# Patient Record
Sex: FEMALE | Race: WHITE | URBAN - METROPOLITAN AREA
[De-identification: names, ages, dates, MRNs, and addresses within clinical notes are randomized per-mention and may not be internally consistent; named-entity substitution may affect disease eponyms.]

---

## 2017-01-12 DIAGNOSIS — L64.9 ANDROGENETIC ALOPECIA: Primary | ICD-10-CM

## 2017-01-13 RX ORDER — MOMETASONE FUROATE 1 MG/G
CREAM TOPICAL
Qty: 45 G | Refills: 1 | Status: SHIPPED | OUTPATIENT
Start: 2017-01-13

## 2017-01-13 NOTE — TELEPHONE ENCOUNTER
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 Per last note 9/19/16 patient is to continue this medication until f/u appointment 3/13/17. Refill granted.

## 2017-01-13 NOTE — TELEPHONE ENCOUNTER
Last seen 9/19/16: Appt schedule for 3/13/17  Androgenetic alopecia with suspected concomitant traction alopecia and seborrheic dermatitis: Hair regrowth along bitemporal scalp is significantly improved when compared to photos taken a previous visit.  Continues to have diffuse hair thinning on vertex scalp, with mild improvement compared to previous photos. Adherent greasy scalp present diffusely on scalp consistent with seborrheic dermatitis, which may be contributing to less significant hair regrowth on vertex scalp.      Start derma-smoothe 0.01% oil 2-3 x per week. Discussed with patient that she does not have to wash out oil the next day if she does not want to, but instructed her to apply it to her scalp the night before she goes to the salon to get hair washed so that it can be washed out at that time.       Continue mometasone 0.1% cream to scalp daily    Continue laser comb 3-4x per week    Discussed starting Rogaine with patient at next visit once she has started to treat her scalp with derma-smoothe.  Advised patient to determine whether she would like to initially try the foam or the liquid formulation, and to let us know at her next visit.    Checked patient's BP today, given added HCTZ 12.5 mg since last visit, and will consider adding spironolactone at future visits if Ok with her primary care doctor.      Photographs taken for future reference    CC Dr. Araujo on close of this encounter.    Follow-up in 5 and in 6 months, earlier for new or changing lesions.

## 2017-03-13 ENCOUNTER — OFFICE VISIT (OUTPATIENT)
Dept: DERMATOLOGY | Facility: CLINIC | Age: 72
End: 2017-03-13

## 2017-03-13 VITALS — HEART RATE: 57 BPM | SYSTOLIC BLOOD PRESSURE: 104 MMHG | DIASTOLIC BLOOD PRESSURE: 68 MMHG

## 2017-03-13 DIAGNOSIS — L65.8 TRACTION ALOPECIA: ICD-10-CM

## 2017-03-13 DIAGNOSIS — L30.9 DERMATITIS: ICD-10-CM

## 2017-03-13 DIAGNOSIS — L65.9 LOSS OF HAIR: Primary | ICD-10-CM

## 2017-03-13 ASSESSMENT — PAIN SCALES - GENERAL: PAINLEVEL: NO PAIN (0)

## 2017-03-13 NOTE — NURSING NOTE
"Dermatology Rooming Note    Kim Mcfarland's goals for this visit include:   Chief Complaint   Patient presents with     Hair Loss     Kim is here for hair loss, Kim states \" Its worse maybe I need a shot.\"     Nika Hammonds LPN  "

## 2017-03-13 NOTE — MR AVS SNAPSHOT
After Visit Summary   3/13/2017    Kim Mcfarland    MRN: [id number]           Patient Information     Date Of Birth          1945        Visit Information        Provider Department      3/13/2017 10:45 AM Keila Ocampo MD [facility]        Today's Diagnoses     Loss of hair    -  1    Dermatitis        Traction alopecia           Follow-ups after your visit        Your next 10 appointments already scheduled     2017 10:00 AM CDT   (Arrive by 9:45 AM)   RETURN HAIRLOSS with MD LUI[facility] ([facility])    [address] [place] [id number] [phone]              Who to contact     Please call your clinic at [phone] to:    Ask questions about your health    Make or cancel appointments    Discuss your medicines    Learn about your test results    Speak to your doctor   If you have compliments or concerns about an experience at your clinic, or if you wish to file a complaint, please contact [facility] Patient Relations at [phone] or email us at [email or web address]         Additional Information About Your Visit        MyChart Information     PrepChampst is an electronic gateway that provides easy, online access to your medical records. With FolderBoy, you can request a clinic appointment, read your test results, renew a prescription or communicate with your care team.     To sign up for PrepChampst visit the website at [email or web address]   You will be asked to enter the access code listed below, as well as some personal information. Please follow the directions to create your username and password.     Your access code is: [email or web address]  Expires: 2017  7:30 AM     Your access code will  in 90 days. If you need help or a new code, please contact your [facility] or call [phone] for  assistance.        Care EveryWhere ID     This is your Care EveryWhere ID. This could be used by other organizations to access your [place] medical records  [id number]        Your Vitals Were     Pulse                   57            Blood Pressure from Last 3 Encounters:   03/13/17 104/68   09/19/16 105/68   04/19/16 140/85    Weight from Last 3 Encounters:   01/25/16 63.5 kg (140 lb)   01/12/16 63.8 kg (140 lb 9.6 oz)              Today, you had the following     No orders found for display       Primary Care Provider Office Phone # Fax #    Bernadette Alcaraz [phone]       [facility] [address] [facility] [address]        Thank you!     Thank you for choosing [facility]  for your care. Our goal is always to provide you with excellent care. Hearing back from our patients is one way we can continue to improve our services. Please take a few minutes to complete the written survey that you may receive in the mail after your visit with us. Thank you!             Your Updated Medication List - Protect others around you: Learn how to safely use, store and throw away your medicines at www.disposemymeds.org.          This list is accurate as of: 3/13/17 11:59 PM.  Always use your most recent med list.                   Brand Name Dispense Instructions for use    ASPIRIN PO      Take by mouth at onset of headache       citalopram 10 MG tablet    celeXA     Take 5 mg by mouth daily       * DERMA-SMOOTHE/FS BODY 0.01 % Oil     118.28 mL    Apply 1 to 2 capfulls to scalp 2-3 times per week, Massage into dry scalp, leave on overnight       * DERMA-SMOOTHE/FS SCALP 0.01 % Oil     118 mL    Apply thin layer of oil to scalp 2-3 times per week (can wash out in the morning if desired) to help remove scale present on scalp.  Apply to scalp the night before going to hair salon to have oil washed out the next day.       famotidine 20 MG tablet    PEPCID     Take 20 mg by mouth 2 times daily        LISINOPRIL PO      Take 20 mg by mouth daily       mometasone 0.1 % cream    ELOCON    45 g    APPLY TOPICALLY TO THE AFFECTED AREA TO AREAS OF FRONTAL HAIRLOSS       MULTIVITAL PO          RIZATRIPTAN BENZOATE PO      Take 10 mg by mouth at onset of headache       TOPIRAMATE PO      Take 25 mg by mouth daily       * Notice:  This list has 2 medication(s) that are the same as other medications prescribed for you. Read the directions carefully, and ask your doctor or other care provider to review them with you.

## 2017-03-13 NOTE — PROGRESS NOTES
"[facility] Dermatology Note    Dermatology Problem List:  1. 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 Irritated seborrheic keratosis on lower central abdomen-resolved  -s/p cryo 1/25/16    Encounter Date: Mar 13, 2017    CC:  Chief Complaint   Patient presents with     Hair Loss     Kim is here for hair loss, Kim states \" Its worse maybe I need a shot.\"     History of Present Illness:  Ms. 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 The patient was last seen 9/19/16 when a seborrheic dermatitis was present on the scalp, but evidence of regrowth was also present. She is currently treating her scalp with derma-smoothe/FS 0.01% oil 2-3x per week, mometasone 0.1% cream, Rogaine 5% foam, and HairMax laser comb 2-3x per week. She washes her hair once per week with an OTC shampoo. Ms. Mcfarland also has her hair washed and chemical relaxer placed on her scalp every 8 weeks.    Since her last visit, Ms. Mcfarland states that her hair loss has continued to progress. She states that her hair loss is in a band-like pattern along her frontal scalp and often wears a headband to help cover-up her hair loss. She reports intermittent scalp pruritus and burning, but denies scalp pain. Ms. Mcfarland denies hair loss elsewhere on her body such as eyebrows or eyelashes.  She denies any changes in her medications or past medical history since her last visit.     Past Medical History:   Patient Active Problem List   Diagnosis     Loss of " hair     Dermatitis     Past Medical History   Diagnosis Date     GERD (gastroesophageal reflux disease)      Hypertension      No past surgical history on file.    Social History:  The patient is retired.      Family History:  There is a family history of hair loss in the patient's sister. No family history of autoimmune diseases.     Medications:  Current Outpatient Prescriptions   Medication Sig Dispense Refill     mometasone (ELOCON) 0.1 % cream APPLY TOPICALLY TO THE AFFECTED AREA TO AREAS OF FRONTAL HAIRLOSS 45 g 1     Fluocinolone Acetonide (DERMA-SMOOTHE/FS SCALP) 0.01 % OIL Apply thin layer of oil to scalp 2-3 times per week (can wash out in the morning if desired) to help remove scale present on scalp.  Apply to scalp the night before going to hair salon to have oil washed out the next day. 118 mL 3     citalopram (CELEXA) 10 MG tablet Take 5 mg by mouth daily       famotidine (PEPCID) 20 MG tablet Take 20 mg by mouth 2 times daily       Fluocinolone Acetonide (DERMA-SMOOTHE/FS BODY) 0.01 % OIL Apply 1 to 2 capfulls to scalp 2-3 times per week, Massage into dry scalp, leave on overnight 118.28 mL 2     LISINOPRIL PO Take 20 mg by mouth daily       TOPIRAMATE PO Take 25 mg by mouth daily       RIZATRIPTAN BENZOATE PO Take 10 mg by mouth at onset of headache       ASPIRIN PO Take by mouth at onset of headache       Multiple Vitamins-Minerals (MULTIVITAL PO)        Allergies   Allergen Reactions     Ranitidine Nausea     Meloxicam Rash     Review of Systems:  -Constitutional: The patient denies fatigue, fevers, chills, unintended weight loss, and night sweats.  -HEENT: Patient denies nonhealing oral sores.  -Skin: As above in HPI. No additional skin concerns.    Physical exam:  Vitals: /68  Pulse 57  GEN: This is a well developed, well-nourished female in no acute distress, in a pleasant mood.    SKIN: Focused examination of the face, scalp,and fingernails was performed.  -no [place] tree pattern  present  -midline part width 1.2  -regrowth layers:    1st layer: 1 cm fibers   2nd layer: 3-4 cm fibers   3rd layer: 6-7 cm fibers  -mild greasy scale present along scalp  -short 1-2 cm fibers present along frontotemporal scalp hairline in a band-like pattern  -no hair loss present on eyebrows or eyelashes  -mild longitudinal ridging present along bilateral fingernails   -No other lesions of concern on areas examined.     Impression/Plan:  1. Androgenetic alopecia with suspected concomitant traction alopecia and seborrheic dermatitis: tighter midline part width present on examination today with no [place] tree pattern present. Improved regrowth present along entire frontotemporal scalp hairline.      Continue to use derma-smoothe/FS 0.01% oil 2-3x per week    Continue to use Rogaine 5% foam daily    Continue HairMax laser comb 2-3x per week    Will plan to check Vit D and iron stores at her next visit to rule out any underlying lab abnormalities which may be contributing to her hair loss. She endorses taking a daily multivitamin, but no additional supplements     Discussed with Ms. Mcfarland use of Wow Growth Hair Oil, and did not find any concerning ingredients to prohibit use of this product if Ms. Mcfarland would like to use it.    Discontinue use of mometasone 0.1% cream daily given clinical improvement present on examination today    Photographs taken for future reference.    CC Dr. Araujo on close of this encounter.  Follow-up in 4 months, earlier for new or changing lesions.     Staff Involved:  Scribed by Raquel Loera, MS4 for Dr. Ocampo.        I agree with the PFSH and ROS as completed by the Medical Student. The remainder of the encounter was performed by me and scribed by the Medical Student. The scribed note accurately reflects my personal services and the medical decisions made by me.      Keila Ocampo MD  Professor and Chair  Department of Dermatology  [facility]

## 2017-03-13 NOTE — LETTER
"3/13/2017       RE: Kim Mcfarland  [address]  [facility] [address]     Dear Colleague,    Thank you for referring your patient, Kim Mcfarland, to the [facility] D[facility] at [facility]. Please see a copy of my visit note below.                        Pictures were placed in Pt's chart today for future reference.        [facility] Dermatology Note    Dermatology Problem List:  1. 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 Irritated seborrheic keratosis on lower central abdomen-resolved  -s/p cryo 1/25/16    Encounter Date: Mar 13, 2017    CC:  Chief Complaint   Patient presents with     Hair Loss     Kim is here for hair loss, Kim states \" Its worse maybe I need a shot.\"     History of Present Illness:  Ms. 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 The patient was last seen 9/19/16 when a seborrheic dermatitis was present on the scalp, but evidence of regrowth was also present. She is currently treating her scalp with derma-smoothe/FS 0.01% oil 2-3x per week, mometasone 0.1% cream, Rogaine 5% foam, and HairMax laser comb 2-3x per week. She washes her hair once per week with an OTC shampoo. Ms. Mcfarland also has her hair washed and chemical relaxer placed on her scalp every 8 weeks.    Since her last visit, Ms. Mcfarland states that her hair loss has continued to progress. She states that her hair loss is in a band-like pattern along her frontal scalp and often wears " a headband to help cover-up her hair loss. She reports intermittent scalp pruritus and burning, but denies scalp pain. Ms. Mcfarland denies hair loss elsewhere on her body such as eyebrows or eyelashes.  She denies any changes in her medications or past medical history since her last visit.     Past Medical History:   Patient Active Problem List   Diagnosis     Loss of hair     Dermatitis     Past Medical History   Diagnosis Date     GERD (gastroesophageal reflux disease)      Hypertension      No past surgical history on file.    Social History:  The patient is retired.      Family History:  There is a family history of hair loss in the patient's sister. No family history of autoimmune diseases.     Medications:  Current Outpatient Prescriptions   Medication Sig Dispense Refill     mometasone (ELOCON) 0.1 % cream APPLY TOPICALLY TO THE AFFECTED AREA TO AREAS OF FRONTAL HAIRLOSS 45 g 1     Fluocinolone Acetonide (DERMA-SMOOTHE/FS SCALP) 0.01 % OIL Apply thin layer of oil to scalp 2-3 times per week (can wash out in the morning if desired) to help remove scale present on scalp.  Apply to scalp the night before going to hair salon to have oil washed out the next day. 118 mL 3     citalopram (CELEXA) 10 MG tablet Take 5 mg by mouth daily       famotidine (PEPCID) 20 MG tablet Take 20 mg by mouth 2 times daily       Fluocinolone Acetonide (DERMA-SMOOTHE/FS BODY) 0.01 % OIL Apply 1 to 2 capfulls to scalp 2-3 times per week, Massage into dry scalp, leave on overnight 118.28 mL 2     LISINOPRIL PO Take 20 mg by mouth daily       TOPIRAMATE PO Take 25 mg by mouth daily       RIZATRIPTAN BENZOATE PO Take 10 mg by mouth at onset of headache       ASPIRIN PO Take by mouth at onset of headache       Multiple Vitamins-Minerals (MULTIVITAL PO)        Allergies   Allergen Reactions     Ranitidine Nausea     Meloxicam Rash     Review of Systems:  -Constitutional: The patient denies fatigue, fevers, chills, unintended weight loss, and  night sweats.  -HEENT: Patient denies nonhealing oral sores.  -Skin: As above in HPI. No additional skin concerns.    Physical exam:  Vitals: /68  Pulse 57  GEN: This is a well developed, well-nourished female in no acute distress, in a pleasant mood.    SKIN: Focused examination of the face, scalp,and fingernails was performed.  -no [place] tree pattern present  -midline part width 1.2  -regrowth layers:    1st layer: 1 cm fibers   2nd layer: 3-4 cm fibers   3rd layer: 6-7 cm fibers  -mild greasy scale present along scalp  -short 1-2 cm fibers present along frontotemporal scalp hairline in a band-like pattern  -no hair loss present on eyebrows or eyelashes  -mild longitudinal ridging present along bilateral fingernails   -No other lesions of concern on areas examined.     Impression/Plan:  1. Androgenetic alopecia with suspected concomitant traction alopecia and seborrheic dermatitis: tighter midline part width present on examination today with no [place] tree pattern present. Improved regrowth present along entire frontotemporal scalp hairline.      Continue to use derma-smoothe/FS 0.01% oil 2-3x per week    Continue to use Rogaine 5% foam daily    Continue HairMax laser comb 2-3x per week    Will plan to check Vit D and iron stores at her next visit to rule out any underlying lab abnormalities which may be contributing to her hair loss. She endorses taking a daily multivitamin, but no additional supplements     Discussed with Ms. Mcfarland use of Wow Growth Hair Oil, and did not find any concerning ingredients to prohibit use of this product if Ms. Mcfarland would like to use it.    Discontinue use of mometasone 0.1% cream daily given clinical improvement present on examination today    Photographs taken for future reference.    CC Dr. Araujo on close of this encounter.  Follow-up in 4 months, earlier for new or changing lesions.     Staff Involved:  Scribed by Raquel Loera, MS4 for Dr. Ocampo.        I  agree with the PFSH and ROS as completed by the Medical Student. The remainder of the encounter was performed by me and scribed by the Medical Student. The scribed note accurately reflects my personal services and the medical decisions made by me.      Keila Ocampo MD  Professor and Chair  Department of Dermatology  [facility]

## 2017-04-16 PROBLEM — L65.8 TRACTION ALOPECIA: Status: ACTIVE | Noted: 2017-04-16
